# Patient Record
Sex: MALE | Race: ASIAN | NOT HISPANIC OR LATINO | URBAN - METROPOLITAN AREA
[De-identification: names, ages, dates, MRNs, and addresses within clinical notes are randomized per-mention and may not be internally consistent; named-entity substitution may affect disease eponyms.]

---

## 2024-01-16 ENCOUNTER — EMERGENCY (EMERGENCY)
Facility: HOSPITAL | Age: 69
LOS: 1 days | Discharge: HOME | End: 2024-01-16
Attending: EMERGENCY MEDICINE | Admitting: EMERGENCY MEDICINE
Payer: SELF-PAY

## 2024-01-16 VITALS
TEMPERATURE: 98 F | WEIGHT: 191.8 LBS | DIASTOLIC BLOOD PRESSURE: 83 MMHG | RESPIRATION RATE: 18 BRPM | SYSTOLIC BLOOD PRESSURE: 147 MMHG | OXYGEN SATURATION: 95 % | HEART RATE: 64 BPM

## 2024-01-16 DIAGNOSIS — I10 ESSENTIAL (PRIMARY) HYPERTENSION: ICD-10-CM

## 2024-01-16 DIAGNOSIS — R73.03 PREDIABETES: ICD-10-CM
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
## 2024-01-16 DIAGNOSIS — S09.90XA UNSPECIFIED INJURY OF HEAD, INITIAL ENCOUNTER: ICD-10-CM

## 2024-01-16 DIAGNOSIS — Z86.19 PERSONAL HISTORY OF OTHER INFECTIOUS AND PARASITIC DISEASES: ICD-10-CM
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
## 2024-01-16 DIAGNOSIS — S06.5X0A TRAUMATIC SUBDURAL HEMORRHAGE WITHOUT LOSS OF CONSCIOUSNESS, INITIAL ENCOUNTER: ICD-10-CM

## 2024-01-16 DIAGNOSIS — E78.5 HYPERLIPIDEMIA, UNSPECIFIED: ICD-10-CM

## 2024-01-16 LAB
ALBUMIN SERPL ELPH-MCNC: 3.6 G/DL — SIGNIFICANT CHANGE UP (ref 3.4–5)
ALP SERPL-CCNC: 72 U/L — SIGNIFICANT CHANGE UP (ref 40–120)
ALT FLD-CCNC: 49 U/L — HIGH (ref 12–42)
ANION GAP SERPL CALC-SCNC: 7 MMOL/L — LOW (ref 9–16)
APTT BLD: 29 SEC — SIGNIFICANT CHANGE UP (ref 24.5–35.6)
AST SERPL-CCNC: 26 U/L — SIGNIFICANT CHANGE UP (ref 15–37)
BASOPHILS # BLD AUTO: 0.04 K/UL — SIGNIFICANT CHANGE UP (ref 0–0.2)
BASOPHILS NFR BLD AUTO: 0.4 % — SIGNIFICANT CHANGE UP (ref 0–2)
BILIRUB SERPL-MCNC: 0.6 MG/DL — SIGNIFICANT CHANGE UP (ref 0.2–1.2)
BUN SERPL-MCNC: 15 MG/DL — SIGNIFICANT CHANGE UP (ref 7–23)
CALCIUM SERPL-MCNC: 9 MG/DL — SIGNIFICANT CHANGE UP (ref 8.5–10.5)
CHLORIDE SERPL-SCNC: 103 MMOL/L — SIGNIFICANT CHANGE UP (ref 96–108)
CO2 SERPL-SCNC: 29 MMOL/L — SIGNIFICANT CHANGE UP (ref 22–31)
CREAT SERPL-MCNC: 1.13 MG/DL — SIGNIFICANT CHANGE UP (ref 0.5–1.3)
EGFR: 71 ML/MIN/1.73M2 — SIGNIFICANT CHANGE UP
EOSINOPHIL # BLD AUTO: 0.09 K/UL — SIGNIFICANT CHANGE UP (ref 0–0.5)
EOSINOPHIL NFR BLD AUTO: 0.9 % — SIGNIFICANT CHANGE UP (ref 0–6)
FLUAV AG NPH QL: SIGNIFICANT CHANGE UP
FLUBV AG NPH QL: SIGNIFICANT CHANGE UP
GLUCOSE SERPL-MCNC: 162 MG/DL — HIGH (ref 70–99)
HCT VFR BLD CALC: 49.1 % — SIGNIFICANT CHANGE UP (ref 39–50)
HGB BLD-MCNC: 16.4 G/DL — SIGNIFICANT CHANGE UP (ref 13–17)
IMM GRANULOCYTES NFR BLD AUTO: 0.5 % — SIGNIFICANT CHANGE UP (ref 0–0.9)
INR BLD: 0.87 — SIGNIFICANT CHANGE UP (ref 0.85–1.18)
LYMPHOCYTES # BLD AUTO: 1.74 K/UL — SIGNIFICANT CHANGE UP (ref 1–3.3)
LYMPHOCYTES # BLD AUTO: 18 % — SIGNIFICANT CHANGE UP (ref 13–44)
MCHC RBC-ENTMCNC: 30.8 PG — SIGNIFICANT CHANGE UP (ref 27–34)
MCHC RBC-ENTMCNC: 33.4 GM/DL — SIGNIFICANT CHANGE UP (ref 32–36)
MCV RBC AUTO: 92.3 FL — SIGNIFICANT CHANGE UP (ref 80–100)
MONOCYTES # BLD AUTO: 0.58 K/UL — SIGNIFICANT CHANGE UP (ref 0–0.9)
MONOCYTES NFR BLD AUTO: 6 % — SIGNIFICANT CHANGE UP (ref 2–14)
NEUTROPHILS # BLD AUTO: 7.16 K/UL — SIGNIFICANT CHANGE UP (ref 1.8–7.4)
NEUTROPHILS NFR BLD AUTO: 74.2 % — SIGNIFICANT CHANGE UP (ref 43–77)
NRBC # BLD: 0 /100 WBCS — SIGNIFICANT CHANGE UP (ref 0–0)
PLATELET # BLD AUTO: 182 K/UL — SIGNIFICANT CHANGE UP (ref 150–400)
POTASSIUM SERPL-MCNC: 4.7 MMOL/L — SIGNIFICANT CHANGE UP (ref 3.5–5.3)
POTASSIUM SERPL-SCNC: 4.7 MMOL/L — SIGNIFICANT CHANGE UP (ref 3.5–5.3)
PROT SERPL-MCNC: 7.1 G/DL — SIGNIFICANT CHANGE UP (ref 6.4–8.2)
PROTHROM AB SERPL-ACNC: 9.6 SEC — SIGNIFICANT CHANGE UP (ref 9.5–13)
RBC # BLD: 5.32 M/UL — SIGNIFICANT CHANGE UP (ref 4.2–5.8)
RBC # FLD: 11.5 % — SIGNIFICANT CHANGE UP (ref 10.3–14.5)
RSV RNA NPH QL NAA+NON-PROBE: SIGNIFICANT CHANGE UP
SARS-COV-2 RNA SPEC QL NAA+PROBE: SIGNIFICANT CHANGE UP
SODIUM SERPL-SCNC: 139 MMOL/L — SIGNIFICANT CHANGE UP (ref 132–145)
WBC # BLD: 9.66 K/UL — SIGNIFICANT CHANGE UP (ref 3.8–10.5)
WBC # FLD AUTO: 9.66 K/UL — SIGNIFICANT CHANGE UP (ref 3.8–10.5)

## 2024-01-16 PROCEDURE — 72125 CT NECK SPINE W/O DYE: CPT | Mod: 26

## 2024-01-16 PROCEDURE — 99223 1ST HOSP IP/OBS HIGH 75: CPT

## 2024-01-16 PROCEDURE — 70450 CT HEAD/BRAIN W/O DYE: CPT | Mod: 26

## 2024-01-16 NOTE — ED CDU PROVIDER INITIAL DAY NOTE - OBJECTIVE STATEMENT
Pt is a 68 yr old male who was walking outside and slipped and fell backwards and hit the back of his head on the right side.  No LOC.  No Headache.  Not on any blood thinners.  He is c/o feeling a little dizzy (Lightheaded) since the fall.  He does not live in [place]; he is visiting from [place].  States no other injuries--just to the back of his head.  I used interpretor #[id number] (Mandarin).    PMH: Pre-diabetes, Hepatitis B, HTN, Dyslipidemia  Allergies: NKDA  Social hx: no tobacco, occasional ETOH

## 2024-01-16 NOTE — ED ADULT NURSE NOTE - NSFALLRISKINTERV_ED_ALL_ED

## 2024-01-16 NOTE — ED ADULT TRIAGE NOTE - CHIEF COMPLAINT QUOTE
here for mechanical fall with head strike- pt ambulated into ED with c/o fatigue and some dizziness -Pt is a+Ox3 denies change in vision, nausea or vomiting- h/o htn, hld and Hep B here for mechanical fall with head strike- pt ambulated into ED with c/o fatigue and some dizziness -Pt is a+Ox3 denies change in vision, nausea or vomiting- h/o htn, hld and Hep B- BEFAST neg

## 2024-01-16 NOTE — ED PROVIDER NOTE - CLINICAL SUMMARY MEDICAL DECISION MAKING FREE TEXT BOX
Pt is a 68 yr old male who was walking outside and slipped and fell backwards and hit the back of his head on the right side.  No LOC.  No Headache.  Not on any blood thinners.  He is c/o feeling a little dizzy (Lightheaded) since the fall.  [place] not live in [place]; he is visiting from [place].  States no other injuries--just to the back of his head.  I used interpretor #[id number] (Mandarin).  (+) tenderness to occiput on exam but exam is non-focal  (+) hemorrhage on CT head  Neurosurgery consulted

## 2024-01-16 NOTE — ED CDU PROVIDER INITIAL DAY NOTE - DETAILS
Pt fell and hit head.  He has a SDH on CT brain.  Neurosurgery recommends repeat CT head in 4 to 6 hours Pt fell and hit head.  He has a SDH on CT brain.  I spoke to neurosurgery on-call attending physician Dr. Monika Mclean.  Dr. Mclean was also able to review the images himself.  He states this is a type of Subdural hematoma.  He recommends a repeat CT of the head in 4 to 6 hours.  In no expansion, then he would be OK for discharge.  If there is an expansion, he would require admission for observation.

## 2024-01-16 NOTE — ED PROVIDER NOTE - OBJECTIVE STATEMENT
Pt is a 68 yr old male who was walking outside and slipped and fell backwards and hit the back of his head on the right side.  No LOC.  No Headache.  Not on any blood thinners.  He is c/o feeling a little dizzy (Lightheaded) since the fall.  [place] not live in [place]; he is visiting from [place].    PMH: Pre-diabetes, Hepatitis B, HTN, Dyslipidemia  Allergies: NKDA  Social hx: no tobacco, occasional ETOH Pt is a 68 yr old male who was walking outside and slipped and fell backwards and hit the back of his head on the right side.  No LOC.  No Headache.  Not on any blood thinners.  He is c/o feeling a little dizzy (Lightheaded) since the fall.  [place] live in [place]; he is visiting from [place].  States no other injuries--just to the back of his head.  I used interpretor #[id number].    PMH: Pre-diabetes, Hepatitis B, HTN, Dyslipidemia  Allergies: NKDA  Social hx: no tobacco, occasional ETOH Pt is a 68 yr old male who was walking outside and slipped and fell backwards and hit the back of his head on the right side.  No LOC.  No Headache.  Not on any blood thinners.  He is c/o feeling a little dizzy (Lightheaded) since the fall.  He does not live in [place]; he is visiting from [place].  States no other injuries--just to the back of his head.  I used interpretor #[id number] (Mandarin).    PMH: Pre-diabetes, Hepatitis B, HTN, Dyslipidemia  Allergies: NKDA  Social hx: no tobacco, occasional ETOH

## 2024-01-16 NOTE — ED CDU PROVIDER INITIAL DAY NOTE - CLINICAL SUMMARY MEDICAL DECISION MAKING FREE TEXT BOX
Pt is a 68 yr old male who was walking outside and slipped and fell backwards and hit the back of his head on the right side.  No LOC.  No Headache.  Not on any blood thinners.  He is c/o feeling a little dizzy (Lightheaded) since the fall.  He [place] live in [place]; he is visiting from [place].  States no other injuries--just to the back of his head.  I used interpretor #[id number] (Mandarin).  (+) tenderness to occiput on exam but exam is non-focal  (+) hemorrhage on CT head  Neurosurgery consulted

## 2024-01-16 NOTE — ED CDU PROVIDER INITIAL DAY NOTE - PROGRESS NOTE DETAILS
Patient signed out to ED attending Dr. Cris Erickson - to follow up repeat CT scan.  Repeat CT at 11:30PM Patient was endorsed to me his repeat scan is not showing any extension of the subdural bleeding, and I discussed this with his daughter who is serving as his .  I asked patient if he had any additional symptoms and he reports that he only has mild dizziness but otherwise has no severe headache there is been no vomiting there is been no changes in mental status while in the emergency department under observation.  I do long detailed discussion with daughter they will be returning back to [place] on January 20.  They are able to change her trip if they wish that at this time if father remains clinically stable I advised her fall precautions, return to emergency room prior to the trip if he develops any changes in mental status intractable nausea vomiting severe headaches severe dizziness syncope.  Daughter is well aware of symptoms and will also arrange with wheelchair transport for travel as well given a travel letter.  I gave him the information of the neurosurgeon that we consulted if there are any concerns she should return back to the emergency room.  She will establish up follow-up with neurosurgery for MRI and further management when they return to [place] [facility]gio - Patient was endorsed to me pending repeat scan family was updated that we will repeat the scan at 1130 and then await a read.  Family agrees with plan patient is resting comfortably sergio repeat scan is not showing any extension of the subdural bleeding, and I discussed this with his daughter who is serving as his .  I asked patient if he had any additional symptoms and he reports that he only has mild dizziness but otherwise has no severe headache there is been no vomiting there is been no changes in mental status while in the emergency department under observation.  I do long detailed discussion with daughter they will be returning back to [place] on January 20.  They are able to change her trip if they wish that at this time if father remains clinically stable I advised her fall precautions, return to emergency room prior to the trip if he develops any changes in mental status intractable nausea vomiting severe headaches severe dizziness syncope.  Daughter is well aware of symptoms and will also arrange with wheelchair transport for travel as well given a travel letter.  I gave him the information of the neurosurgeon that we consulted if there are any concerns she should return back to the emergency room.  She will establish up follow-up with neurosurgery for MRI and further management when they return to [place]

## 2024-01-16 NOTE — ED ADULT NURSE NOTE - CHIEF COMPLAINT QUOTE
here for mechanical fall with head strike- pt ambulated into ED with c/o fatigue and some dizziness -Pt is a+Ox3 denies change in vision, nausea or vomiting- h/o htn, hld and Hep B- BEFAST neg

## 2024-01-16 NOTE — ED PROVIDER NOTE - PROGRESS NOTE DETAILS
José Miguel:  I called the transfer center just now to get a neurosurgery consultation.  The transfer center employee will put a page out to neurosurgery on-call as could not connect directly. Attending physician Dr. Valdez:  I spoke to neurosurgery on-call Dr. Monika Mclean.  Dr. Mclean was also able to review the images himself.  He states this is a type of Subdural hematoma.  He recommends a repeat CT of the head in 4 to 6 hours.  In no expansion, then he would be OK for discharge.  If there is an expansion, he would require admission for observation. Attending physician Dr. Valdez:  I spoke to neurosurgery on-call attending physician Dr. Monika Mclean.  Dr. Mclean was also able to review the images himself.  He states this is a type of Subdural hematoma.  He recommends a repeat CT of the head in 4 to 6 hours.  In no expansion, then he would be OK for discharge.  If there is an expansion, he would require admission for observation.

## 2024-01-16 NOTE — ED PROVIDER NOTE - PHYSICAL EXAMINATION
General: Patient is A&O x 3 in NAD  Head: NC, (+) tenderness to right occiput, no hematoma  Eyes: EOMI, no lid lag, no proptosis  Ears: Normal  Nose: Normal  Neck: Normal ROM, nontender  Lungs: Normal respiratory effort  Heart: Normal rate, no peripheral edema  Neuro: Gait normal, nonfocal, motor/sensory grossly intact  Skin: No rash, lesions or ulcers  Psych: Normal affect and behavior, intact judgement and insight

## 2024-01-17 VITALS
HEART RATE: 71 BPM | DIASTOLIC BLOOD PRESSURE: 69 MMHG | RESPIRATION RATE: 16 BRPM | OXYGEN SATURATION: 98 % | SYSTOLIC BLOOD PRESSURE: 135 MMHG | TEMPERATURE: 98 F

## 2024-01-17 PROCEDURE — 99238 HOSP IP/OBS DSCHRG MGMT 30/<: CPT

## 2024-01-17 PROCEDURE — 70450 CT HEAD/BRAIN W/O DYE: CPT | Mod: 26

## 2024-01-17 NOTE — ED CDU PROVIDER DISPOSITION NOTE - CARE PROVIDER_API CALL
Monika Mclean  Neurosurgery  [address] [place] [address]  Phone: [phone]  Fax: [phone]  Follow Up Time:

## 2024-01-17 NOTE — ED CDU PROVIDER DISPOSITION NOTE - CLINICAL COURSE
Patient placed into observation initially had a CT that was consistent with subdural hemorrhage repeat CT with no extension monitored on observation with no change in mental status or neurological exam.

## 2024-01-17 NOTE — ED CDU PROVIDER DISPOSITION NOTE - PATIENT PORTAL LINK FT
You can access the FollowMyHealth Patient Portal offered by [facility] by registering at the following website: [email or web address]. By joining [facility]’s FollowMyHealth portal, you will also be able to view your health information using other applications (apps) compatible with our system.
